# Patient Record
(demographics unavailable — no encounter records)

---

## 2024-11-29 NOTE — HISTORY OF PRESENT ILLNESS
[de-identified] : Ms. Webb is a 43 year old female with a hx of menorrhagia and fibroids as well as HTN on amlodipiine that presents for initial consultation referred by PCP for anemia.   3/2024 WBC 5.77 hgb 7.7 plt 348 %sat 4 ferritin 4  On PO supplement since 3/26/24  LMP 4/6/24 lasting 5-6d and 3 heavy days   Per patient last GYN US did not reveal fibroids   +fatigue, SOB   Had EGD in past for GERD  [de-identified] : Patient seen and examined today for routine follow up. She is s/p completion of Venofer 5 doses 7/23/24. She notes some improvement to her energy. Denies SOB, CP, HA, RLS, pica. Her BP is high today no cardiac s/s or s/s of HTN. She forgot to take her antihypertensives today. LMP 8/7/24 lasting 3 days very heavy

## 2024-11-29 NOTE — ASSESSMENT
[FreeTextEntry1] : #Anemia  - We have discussed the differential diagnosis of anemia. - Will also rule out causes of anemia including nutritional deficiencies, thyroid dysfunction, hemolysis and hematologic disorders. Will check CBC with diff, CMP, LDH, Haptoglobin, Y[place], B12, Folate, TSH, retic ct, Epo, PS, ESR/CRP, iron and ferritin - ferritin 4 hgb 7.7. can not tolerate po iron due to constipation - We have reviewed the diagnosis of iron deficiency anemia. - The most recent labs were reviewed personally and with the patient.  - I would recommend parenteral iron in the form of venofer 200 mg x 5  - We have reviewed the side effects of venofer to include but are not limited to  N/V,dizziness, infusion reactions, anaphylaxis, HA, rash. - 8/19/24 vs and CBC reviewed; WBC 6.94, hgb 10.8, plt 308. Hgb improved since last visit. Irons repeated today. She is now s/p Venofer 200mg IV weekly x 5 completed 7/23/24. Feels energy is low but is improved.   #Menorraghia  - Likely cause of CRISTHIAN  - LMP 8/7/24 lasting 3 days very heavy has been heavy whole life  - s/p eval with GYN s/p US to check for fibroids   #Health Maintenance  - GYN UTD  - Mammo/Sono UTD  - CNY due at age 45 consider sooner if persistent anemia   #HTN  - Followed by PCP Dr. Rios  - On Chlorithaldione  - Forgot to take today - no cardiac s/s. Advised to take right when she gets home and repeat BP   RTC in 3 mos with CBC with diff, CMP, irons b12/folate EPO, retic, if worsening hgb or no response to iron check full anemia panel   Case and management discussed with Dr. Fritz